# Patient Record
Sex: MALE | Race: OTHER | NOT HISPANIC OR LATINO | URBAN - METROPOLITAN AREA
[De-identification: names, ages, dates, MRNs, and addresses within clinical notes are randomized per-mention and may not be internally consistent; named-entity substitution may affect disease eponyms.]

---

## 2019-04-11 ENCOUNTER — EMERGENCY (EMERGENCY)
Facility: HOSPITAL | Age: 69
LOS: 1 days | Discharge: HOME | End: 2019-04-11
Attending: EMERGENCY MEDICINE | Admitting: EMERGENCY MEDICINE
Payer: COMMERCIAL

## 2019-04-11 VITALS
TEMPERATURE: 98 F | HEART RATE: 77 BPM | SYSTOLIC BLOOD PRESSURE: 145 MMHG | HEIGHT: 73 IN | DIASTOLIC BLOOD PRESSURE: 73 MMHG | RESPIRATION RATE: 18 BRPM | OXYGEN SATURATION: 100 % | WEIGHT: 315 LBS
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
## 2019-04-11 DIAGNOSIS — S37.092A: ICD-10-CM
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
## 2019-04-11 DIAGNOSIS — R07.89 OTHER CHEST PAIN: ICD-10-CM

## 2019-04-11 DIAGNOSIS — R51 HEADACHE: ICD-10-CM
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
## 2019-04-11 PROCEDURE — 71250 CT THORAX DX C-: CPT

## 2019-04-11 PROCEDURE — 70450 CT HEAD/BRAIN W/O DYE: CPT | Mod: 26

## 2019-04-11 PROCEDURE — 71250 CT THORAX DX C-: CPT | Mod: 26

## 2019-04-11 PROCEDURE — 99284 EMERGENCY DEPT VISIT MOD MDM: CPT

## 2019-04-11 PROCEDURE — 70450 CT HEAD/BRAIN W/O DYE: CPT
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
## 2019-04-11 NOTE — ED STATDOCS - IMAGING STUDIES ADDITIONAL INFORMATION FREE TEXT
Imaging studies necessary for evaluation of patient current condition after a detail history and  physical assessment..
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
## 2019-04-11 NOTE — ED STATDOCS - PLAN OF CARE
Pt will F/U with PCP concerning lung CT results  F/U with PCP for renal lesion   OTC pain medication for musculoskeletal pain

## 2020-11-09 NOTE — ED STATDOCS - CPE ED NEURO NORM
----- Message from Maryclare Claude sent at 11/9/2020 10:10 AM EST -----  Subject: Message to Provider    QUESTIONS  Information for Provider? Pt wants to schedule B-12 shot. Pt is also   asking why vitamin D-2 was prescribed. Pt said D-3 is less expensive and   pt wants to know the difference. ---------------------------------------------------------------------------  --------------  Layne SHARIF  What is the best way for the office to contact you? OK to leave message on   voicemail  Preferred Call Back Phone Number? [phone]  ---------------------------------------------------------------------------  --------------  SCRIPT ANSWERS  Relationship to Patient?  Self
normal...